# Patient Record
Sex: FEMALE | Race: WHITE | NOT HISPANIC OR LATINO | Employment: UNEMPLOYED | URBAN - METROPOLITAN AREA
[De-identification: names, ages, dates, MRNs, and addresses within clinical notes are randomized per-mention and may not be internally consistent; named-entity substitution may affect disease eponyms.]

---

## 2022-11-07 PROBLEM — M41.9 SCOLIOSIS: Status: ACTIVE | Noted: 2022-11-07

## 2022-11-07 PROBLEM — M43.16 SPONDYLOLISTHESIS OF LUMBAR REGION: Status: ACTIVE | Noted: 2022-11-07

## 2023-03-16 PROBLEM — M54.16 LUMBAR RADICULOPATHY: Status: ACTIVE | Noted: 2023-03-16

## 2023-10-22 ENCOUNTER — ON-DEMAND VIRTUAL (OUTPATIENT)
Dept: URGENT CARE | Facility: CLINIC | Age: 53
End: 2023-10-22
Payer: MEDICAID

## 2023-10-22 DIAGNOSIS — R20.2 PARESTHESIA: Primary | ICD-10-CM

## 2023-10-22 PROCEDURE — 99203 PR OFFICE/OUTPT VISIT, NEW, LEVL III, 30-44 MIN: ICD-10-PCS | Mod: 95,,, | Performed by: NURSE PRACTITIONER

## 2023-10-22 PROCEDURE — 99203 OFFICE O/P NEW LOW 30 MIN: CPT | Mod: 95,,, | Performed by: NURSE PRACTITIONER

## 2023-10-22 NOTE — PROGRESS NOTES
Subjective:      Patient ID: Kimberly Garcia is a 52 y.o. female.    Vitals:  vitals were not taken for this visit.     Chief Complaint: No chief complaint on file.      Visit Type: TELE AUDIOVISUAL    Present with the patient at the time of consultation: TELEMED PRESENT WITH PATIENT: None    No past medical history on file.  Past Surgical History:   Procedure Laterality Date    APPENDECTOMY       SECTION      COSMETIC SURGERY      EPIDURAL STEROID INJECTION INTO LUMBAR SPINE N/A 2023    Procedure: Injection-steroid-epidural-lumbar;  Surgeon: Amna Rayo MD;  Location: [facility] MAIN OR;  Service: Pain Management;  Laterality: N/A;    EPIDURAL STEROID INJECTION INTO LUMBAR SPINE N/A 3/16/2023    Procedure: Injection-steroid-epidural-lumbar;  Surgeon: Amna Rayo MD;  Location: [facility] MAIN OR;  Service: Pain Management;  Laterality: N/A;    EPIDURAL STEROID INJECTION INTO LUMBAR SPINE Left 2023    Procedure: TRANSFORAMINAL LESI L5-S1;  Surgeon: Amna Rayo MD;  Location: [facility] MAIN OR;  Service: Pain Management;  Laterality: Left;    EPIDURAL STEROID INJECTION INTO LUMBAR SPINE Left 2023    Procedure: Injection-steroid-epidural-lumbar Transforaminal L5-S1;  Surgeon: Amna Rayo MD;  Location: [facility] MAIN OR;  Service: Pain Management;  Laterality: Left;    HYSTERECTOMY      INSERTION OF BREAST IMPLANT      TRANSFORAMINAL EPIDURAL INJECTION OF STEROID Bilateral 10/17/2023    Procedure: Injection,steroid,epidural,transforaminal approach;  Surgeon: Amna Rayo MD;  Location: [facility] MAIN OR;  Service: Pain Management;  Laterality: Bilateral;     Review of patient's allergies indicates:   Allergen Reactions    Codeine Itching     Current Outpatient Medications on File Prior to Visit   Medication Sig Dispense Refill    carisoprodoL (SOMA) 250 mg tablet       cyclobenzaprine (FLEXERIL) 10 MG tablet Take 10 mg by mouth 3 (three) times daily as needed for Muscle spasms.       HYDROcodone-acetaminophen (NORCO)  mg per tablet Take 1 tablet by mouth every 6 (six) hours as needed.      ibuprofen (ADVIL,MOTRIN) 100 MG tablet Take 100 mg by mouth every 6 (six) hours as needed for Temperature greater than.      ibuprofen (ADVIL,MOTRIN) 800 MG tablet Take by mouth.      pregabalin (LYRICA) 25 MG capsule Take 1 capsule (25 mg total) by mouth 2 (two) times daily. 60 capsule 3    temazepam (RESTORIL) 7.5 MG Cap Take 15 mg by mouth nightly as needed.       No current facility-administered medications on file prior to visit.     Family History   Problem Relation Age of Onset    Arthritis Mother     COPD Mother     Diabetes Mother     Arthritis Father     COPD Father     Heart disease Father     Hypertension Father     Diabetes Sister            Ohs Peq Odvv Intake    10/22/2023 10:15 AM CDT - Filed by Patient   Describe your reason for todays visit Had an epidural steroid shot Tuesday. Tight aide is hurting ans right leg is having tingling ans numbness   What is your current physical address in the event of a medical emergency? [address] [place] [address] [place] [address]   Are you able to take your vital signs? No   Please attach any relevant images or files          51 yo female with c/o numbness to her right side. She states she had an epidural injection. She is having numbness and tingling and pain to leg. She denies any motor deficit. She states ankle and toes she can not feel. She denies loss of bowel or bladder. She denies saddle anesthesia. She has had injections before on the left side but first on right. She reports she could not spread her toes on the right which has improved slightly but she has never had this reaction after an injection and pain is 5/10        Constitution: Negative.   HENT: Negative.     Cardiovascular: Negative.    Respiratory: Negative.     Gastrointestinal: Negative.    Endocrine: negative.   Genitourinary: Negative.  Negative for frequency and urgency.    Musculoskeletal: Negative.    Skin: Negative.    Allergic/Immunologic: Negative.    Neurological:  Positive for numbness and tingling. Negative for coordination disturbances and loss of balance.   Hematologic/Lymphatic: Negative.    Psychiatric/Behavioral: Negative.          Objective:   The physical exam was conducted virtually.  Physical Exam   Constitutional: She is oriented to person, place, and time. She appears well-developed.   HENT:   Head: Normocephalic and atraumatic.   Ears:   Right Ear: Hearing, tympanic membrane and external ear normal.   Left Ear: Hearing, tympanic membrane and external ear normal.   Nose: Nose normal.   Mouth/Throat: Uvula is midline, oropharynx is clear and moist and mucous membranes are normal.   Eyes: Conjunctivae and EOM are normal. Pupils are equal, round, and reactive to light.   Neck: Neck supple.   Cardiovascular: Normal rate.   Pulmonary/Chest: Effort normal and breath sounds normal.   Musculoskeletal: Normal range of motion.         General: Normal range of motion.   Neurological: She is alert and oriented to person, place, and time.   Skin: Skin is warm.   Psychiatric: Her behavior is normal. Thought content normal.   Nursing note and vitals reviewed.      Assessment:     1. Paresthesia        Plan:     Hx of GUY injections with numbness and tingling.   Could not determine if rxn to injection vs other etiology   Advised to go to ed for further evaluation.   Paresthesia